# Patient Record
Sex: MALE | NOT HISPANIC OR LATINO | URBAN - METROPOLITAN AREA
[De-identification: names, ages, dates, MRNs, and addresses within clinical notes are randomized per-mention and may not be internally consistent; named-entity substitution may affect disease eponyms.]

---

## 2023-10-10 ENCOUNTER — OFFICE VISIT (OUTPATIENT)
Dept: UROLOGY | Facility: CLINIC | Age: 33
End: 2023-10-10

## 2023-10-10 VITALS
DIASTOLIC BLOOD PRESSURE: 88 MMHG | WEIGHT: 224.19 LBS | SYSTOLIC BLOOD PRESSURE: 138 MMHG | HEIGHT: 72 IN | HEART RATE: 74 BPM | BODY MASS INDEX: 30.37 KG/M2

## 2023-10-10 DIAGNOSIS — N52.8 OTHER MALE ERECTILE DYSFUNCTION: Primary | ICD-10-CM

## 2023-10-10 PROCEDURE — 99204 PR OFFICE/OUTPT VISIT, NEW, LEVL IV, 45-59 MIN: ICD-10-PCS | Mod: S$PBB,,, | Performed by: UROLOGY

## 2023-10-10 PROCEDURE — 99204 OFFICE O/P NEW MOD 45 MIN: CPT | Mod: S$PBB,,, | Performed by: UROLOGY

## 2023-10-10 PROCEDURE — 99999 PR PBB SHADOW E&M-NEW PATIENT-LVL III: CPT | Mod: PBBFAC,,, | Performed by: UROLOGY

## 2023-10-10 PROCEDURE — 99999 PR PBB SHADOW E&M-NEW PATIENT-LVL III: ICD-10-PCS | Mod: PBBFAC,,, | Performed by: UROLOGY

## 2023-10-10 PROCEDURE — 99203 OFFICE O/P NEW LOW 30 MIN: CPT | Mod: PBBFAC,PO | Performed by: UROLOGY

## 2023-10-10 RX ORDER — SILDENAFIL 100 MG/1
100 TABLET, FILM COATED ORAL DAILY PRN
Qty: 30 TABLET | Refills: 11 | Status: SHIPPED | OUTPATIENT
Start: 2023-10-10 | End: 2024-10-09

## 2023-10-10 RX ORDER — SILDENAFIL 100 MG/1
100 TABLET, FILM COATED ORAL DAILY PRN
Qty: 30 TABLET | Refills: 11 | Status: SHIPPED | OUTPATIENT
Start: 2023-10-10 | End: 2023-10-10

## 2023-10-10 NOTE — PROGRESS NOTES
[facility] - Urology   Clinic Note    SUBJECTIVE:     Chief Complaint   Patient presents with    Other    Erectile Dysfunction       Referral from: Self, Aaareferral.    History of Present Illness:  Cornelius Birch is a 33 y.o. male who presents to clinic for erectile dysfunction.    Patient here with complaints of erectile dysfunction.  He endorses difficulty getting and maintaining erections.  He is not able to reliably achieve a strong enough erection for intercourse.  Patient reports that he is no difficulty getting erections for masturbation and has erections in the morning but only has issues getting erections with his wife.  He reports that he had a very strict Druze and did not have intercourse prior to being .    CHRISTOPHER: 6/25    Previous ED treatment: no    Patient endorses no additional complaints at this time.    History reviewed. No pertinent past medical history.    History reviewed. No pertinent surgical history.    History reviewed. No pertinent family history.    Social History     Tobacco Use    Smoking status: Never    Smokeless tobacco: Never   Substance Use Topics    Drug use: Never       No current outpatient medications on file prior to visit.     No current facility-administered medications on file prior to visit.       Review of patient's allergies indicates:  No Known Allergies    Review of Systems:  A review of 10+ systems was conducted with pertinent positive and negative findings documented in HPI with all other systems reviewed and negative.    OBJECTIVE:     Estimated body mass index is 30.41 kg/m² as calculated from the following:    Height as of this encounter: 6' (1.829 m).    Weight as of this encounter: 101.7 kg (224 lb 3.3 oz).    Vital Signs (Most Recent)  Vitals:    10/10/23 1053   BP: 138/88   Pulse: 74       Physical Exam:  GENERAL: patient sitting comfortably  HEENT: normocephalic  NECK: supple, no JVD  PULM: normal chest rise, no increased WOB  HEART:  "non-diaphoretic  ABDO: soft, nondistended, nontender  BACK: no CVA tenderness bilaterally  SKIN: warm, dry, well perfused  EXT: no bruising or edema  NEURO: grossly normal with no focal deficits  PSYCH: appropriate mood and affect    Genitourinary Exam:  deferred    No results found for: "BUN", "CREATININE", "GFRNONAA", "GFRAA", "WBC", "HGB", "HCT", "PLT", "AST", "ALT", "ALKPHOS", "ALBUMIN", "HGBA1C", "PT", "PTT", "INR"     Imaging:   I have personally reviewed all relevant imaging studies.    No results found for this or any previous visit (from the past 2160 hour(s)).  No results found for this or any previous visit (from the past 2160 hour(s)).  No image results found.       PSA:  No results found for: "PSA", "PSADIAG", "PSATOTAL", "PSAFREE"    Testosterone:  No results found for: "TOTALTESTOST", "TESTOSTERONE"     ASSESSMENT     1. Other male erectile dysfunction        PLAN:     Erectile Dysfunction    - We discussed the etiology and management of ED, including organic and psychogenic causes. First line therapy involves treatment with PDE-5 inhibitors.    - We discussed the risks and benefits of treatment with PDE5i's. Treatment with HIMANSHU was also briefly discussed.     - Next line therapies would include intraurethral suppository (125-1000 ug), then intracavernosal injections (1 ml trimix papaverine 30 mg, phentolamine 1 mg, prostaglandin E1 10 ug). Ultimately, a penile prosthesis would be the final option.     - We will initiate therapy with a PDE5i with Viagra 100mg PRN.  I explained in detail that the patient's problem is more likely psychologic than physiologic as he gets erections with masturbation and in the morning and patient should consider seeing someone with experience in sexual and behavioral therapy.    Ronnie Edgar MD  Urology  [facility]    Disclaimer: This note has been generated using voice-recognition software. There may be typographical errors that have been missed " during proof-reading.

## 2024-06-07 ENCOUNTER — PATIENT MESSAGE (OUTPATIENT)
Dept: UROLOGY | Facility: CLINIC | Age: 34
End: 2024-06-07

## 2024-08-02 ENCOUNTER — TELEPHONE (OUTPATIENT)
Dept: UROLOGY | Facility: CLINIC | Age: 34
End: 2024-08-02

## 2024-08-02 NOTE — TELEPHONE ENCOUNTER
----- Message from Joanna Rosado sent at 8/2/2024  2:07 PM CDT -----  Regarding: Returning call  Contact: [phone]  Type:  Patient Returning Call    Who Called:Cornelius  Who Left Message for Patient:Yvescassidylakshmi  Does the patient know what this is regarding?:  Would the patient rather a call back or a response via [facility]? Both  Best Call Back Number:[phone]    Additional Information:

## 2024-08-02 NOTE — TELEPHONE ENCOUNTER
----- Message from Zan Farrell sent at 8/2/2024 12:11 PM CDT -----  Regarding: Callback  Contact: [phone]  Patient calling requesting a callback from nurse or provider. Please call back as soon as possible.

## 2024-08-02 NOTE — TELEPHONE ENCOUNTER
Call was placed back to patient he was successfully scheduled with the provider he was stated that he wanted to see  first instead of

## 2024-08-07 ENCOUNTER — OFFICE VISIT (OUTPATIENT)
Dept: UROLOGY | Facility: CLINIC | Age: 34
End: 2024-08-07

## 2024-08-07 VITALS
HEART RATE: 71 BPM | HEIGHT: 72 IN | SYSTOLIC BLOOD PRESSURE: 142 MMHG | BODY MASS INDEX: 31.56 KG/M2 | DIASTOLIC BLOOD PRESSURE: 81 MMHG | WEIGHT: 233 LBS

## 2024-08-07 DIAGNOSIS — N52.8 OTHER MALE ERECTILE DYSFUNCTION: Primary | ICD-10-CM

## 2024-08-07 PROBLEM — E29.1 TESTICULAR FAILURE: Status: RESOLVED | Noted: 2024-08-07 | Resolved: 2024-08-07

## 2024-08-07 PROBLEM — E29.1 TESTICULAR FAILURE: Status: ACTIVE | Noted: 2024-08-07

## 2024-08-07 PROCEDURE — 99999 PR PBB SHADOW E&M-EST. PATIENT-LVL III: CPT | Mod: PBBFAC,,, | Performed by: UROLOGY

## 2024-08-07 PROCEDURE — 99214 OFFICE O/P EST MOD 30 MIN: CPT | Mod: S$PBB,,, | Performed by: UROLOGY

## 2024-08-07 PROCEDURE — 99213 OFFICE O/P EST LOW 20 MIN: CPT | Mod: PBBFAC | Performed by: UROLOGY

## 2024-08-07 RX ORDER — PAPAVERINE HYDROCHLORIDE 30 MG/ML
INJECTION INTRAMUSCULAR; INTRAVENOUS
Qty: 5 ML | Refills: 0 | Status: SHIPPED | OUTPATIENT
Start: 2024-08-07

## 2024-08-07 RX ORDER — OLMESARTAN MEDOXOMIL 20 MG/1
20 TABLET ORAL DAILY
COMMUNITY

## 2024-08-12 ENCOUNTER — TELEPHONE (OUTPATIENT)
Dept: UROLOGY | Facility: CLINIC | Age: 34
End: 2024-08-12

## 2024-08-12 NOTE — TELEPHONE ENCOUNTER
Called pt to confirm appt on 8/13/24 at 8:40 am. Pt states that he had an emergency come up and will have to cancel appt. States he will call back to reschedule.

## 2024-08-28 ENCOUNTER — TELEPHONE (OUTPATIENT)
Dept: UROLOGY | Facility: CLINIC | Age: 34
End: 2024-08-28

## 2024-08-29 ENCOUNTER — OFFICE VISIT (OUTPATIENT)
Dept: UROLOGY | Facility: CLINIC | Age: 34
End: 2024-08-29

## 2024-08-29 VITALS
BODY MASS INDEX: 31.13 KG/M2 | HEART RATE: 68 BPM | DIASTOLIC BLOOD PRESSURE: 73 MMHG | SYSTOLIC BLOOD PRESSURE: 124 MMHG | HEIGHT: 72 IN | WEIGHT: 229.81 LBS

## 2024-08-29 DIAGNOSIS — N52.8 OTHER MALE ERECTILE DYSFUNCTION: Primary | ICD-10-CM

## 2024-08-29 PROCEDURE — 99215 OFFICE O/P EST HI 40 MIN: CPT | Mod: S$PBB,,, | Performed by: NURSE PRACTITIONER

## 2024-08-29 PROCEDURE — 99213 OFFICE O/P EST LOW 20 MIN: CPT | Mod: PBBFAC | Performed by: NURSE PRACTITIONER

## 2024-08-29 PROCEDURE — 99999 PR PBB SHADOW E&M-EST. PATIENT-LVL III: CPT | Mod: PBBFAC,,, | Performed by: NURSE PRACTITIONER

## 2024-08-29 NOTE — PATIENT INSTRUCTIONS
1. Wipe off top of medication vial with an alcohol prep.   2. With the vial held firmly on a flat surface, insert the syringe into the vial.  3. Now carefully  the vial with the needle in place and turn upside down.  4. You can then push in syringe (like you are giving a shot) to get all the air out of the syringe.  5. You can then pull the plunger of the syringe back down while keeping the needle inserted in the vial to get your desired results.   6. If having difficulty seeing the medication, rapidly pull back the syringe and then you will see the medication fill up.    You may never get all the tiny air bubbles out. It is ok.     He was instructed to keep the dosage at 10 units. If noticing a decrease in reaction he can increase the dosage by 5 units. He may also refill the Rx.     Penile Self-Injection Procedure  Self-injection is a good option for many men with erectile dysfunction (ED). A tiny needle is used to inject medication into the penis. This helps your penis get hard and stay that way long enough for sex. And sex and orgasm will feel as good as always. You may be nervous about doing self-injection at first. But with practice, it will get easier. Your healthcare provider will show you how to do self-injection the first time. The simple steps are outlined on this sheet.  Preparing for Injection  Wash your hands well with soap and water.  Prepare the medication (if needed).  Sit or  a comfortable position in a warm, well-lit room. If you need to, sit or  front of a mirror.  Find an injection site on one side of your penis, in a place with no visible veins. (Dont inject into the top, bottom, or head of the penis.)  Clean the injection site with an alcohol swab. Grasp the head of your penis firmly with your thumb and forefinger (dont just pinch the skin). Stretch the penis so the skin on the shaft is taut.  Injecting the Medication  Rest your penis against your inner thigh and pull  it gently toward your knee. Dont twist or rotate it. This way youll be sure to inject the medication into the spot you chose and cleaned before.  Hold the syringe between your thumb and fingers, like youre holding a pen. Rest your forearm on your thigh for support.  Insert the needle at a 90-degree angle (perpendicular) to the shaft. Do this quickly to reduce discomfort. (The needle should go in easily. If it doesnt, stop right away.)  Move your thumb to the plunger. Press down to inject the medication, counting to 5.  Remove the needle and dispose of it safely.     The injection site can be in any part of the shaded area.     Insert the needle straight into the penis.    Gaining an Erection  Apply pressure to the injection site for a few minutes. This prevents swelling and bruising and helps spread the medication.   Stand up. This may help your erection develop. Foreplay often helps, too.  Your penis should become firm within 10-20 minutes. The erection will last long enough for sex, and maybe longer.  Call Your Doctor If You Have:   An erection that lasts longer than 3-4  hours  Bleeding or bruising  Severe pain  Scarring or curvature of the penis       © 5925-5849 [employer] [facility], [address] [place] [address], [place] [address]. All rights reserved. This information is not intended as a substitute for professional medical care. Always follow your healthcare professional's instructions.

## 2024-08-29 NOTE — PROGRESS NOTES
CHIEF COMPLAINT:    Cornelius Birch is a 34 y.o. male presents today for ED.     HISTORY OF PRESENTING ILLINESS:    Cornelius Birch is a 34 y.o. male who is an established in our clinic. He was seen 08/07/2024 with Dr. Perez for ED.  He and his wife have not been able to have successful intercourse.  He can get an erection, but he loses the erection when he attempts intercourse.  He gets a better erection with masturbation than intercourse.   He's seen multiple providers in the community and has been told that it's psychogenic.    He's tried and failed Viagra and 5 mg of Cialis  Failed oral agents. Failed Soundwave therapy.     He reports a significant amount of stress over this.    He and his wife are here today for ICI education and 1st injection.   Brought in his own medication.           REVIEW OF SYSTEMS:  Review of Systems   Constitutional: Negative.  Negative for chills and fever.   Eyes:  Negative for double vision.   Respiratory:  Negative for cough and shortness of breath.    Cardiovascular:  Negative for chest pain and palpitations.   Gastrointestinal:  Negative for abdominal pain, constipation, diarrhea, nausea and vomiting.   Genitourinary:         See HPI   Musculoskeletal:  Negative for falls.   Neurological:  Negative for dizziness and seizures.   Endo/Heme/Allergies:  Negative for polydipsia.   Psychiatric/Behavioral:  The patient is nervous/anxious.          PATIENT HISTORY:    Past Medical History:   Diagnosis Date    HTN (hypertension)        History reviewed. No pertinent surgical history.    No family history on file.    Social History     Socioeconomic History    Marital status:    Tobacco Use    Smoking status: Never    Smokeless tobacco: Never   Substance and Sexual Activity    Drug use: Never    Sexual activity: Yes     Partners: Female       Allergies:  Patient has no known allergies.    Medications:    Current Outpatient Medications:     olmesartan (BENICAR) 20 MG tablet, Take 20  "mg by mouth once daily., Disp: , Rfl:     PHYSICAL EXAMINATION:  Physical Exam  Vitals and nursing note reviewed.   Constitutional:       General: He is awake.      Appearance: Normal appearance.   HENT:      Head: Normocephalic.      Right Ear: External ear normal.      Left Ear: External ear normal.      Nose: Nose normal.   Cardiovascular:      Rate and Rhythm: Normal rate.   Pulmonary:      Effort: Pulmonary effort is normal. No respiratory distress.   Abdominal:      Tenderness: There is no abdominal tenderness. There is no right CVA tenderness or left CVA tenderness.   Genitourinary:     Penis: Normal and circumcised. No phimosis, paraphimosis or hypospadias.       Testes: Normal.   Musculoskeletal:         General: Normal range of motion.      Cervical back: Normal range of motion.   Skin:     General: Skin is warm and dry.   Neurological:      General: No focal deficit present.      Mental Status: He is alert and oriented to person, place, and time.   Psychiatric:         Mood and Affect: Mood normal.         Behavior: Behavior is cooperative.         LABS:        No results found for: "PSA", "PSADIAG", "PSATOTAL", "PHIND"    No results found for: "CREATININE", "EGFRNORACEVR"            IMPRESSION:    Encounter Diagnoses   Name Primary?    Other male erectile dysfunction Yes         Assessment:       1. Other male erectile dysfunction        Plan:         I spent 40 minutes with the patient. Over 50% of the visit was spent in counseling.    We discussed ED and the contributing factors. We reviewed his personal factors that contribute to ED. Patient was educated on ED treatments. We discussed PDE-5 inhibitors, HIMANSHU, Muse, and IPP.    He is here today for the Intracorporal injection/ICI education and first injection.  Educational materials were supplied.    ICI is an injectable medication that consists of three different medications to produce an erection. It is known as a Trimix Compound or PEP. The medication " is a compound medication and is only mixed up at certain pharmacies known as a compound pharmacy. The medication has to be stored in the refrigerator. Improper storage decreases the effectiveness of the medication.     He was educated that it is an injection. With any injection there is risk for possible pain at the injection site as well as risk for an infection. Clean technique must be followed to help prevent infection. Proper needle disposable is necessary. He voices understanding.    The injection can be given while standing up or while sitting and lean back to prevent penis from retracting. The penis is positioned perpendicular to the body. The urethral opening should be facing away from the body; then gentle pull to the side opposite of the injection site. Injection is always given on the lateral or side of the penis. Never give the injection to the top of the penis to avoid possible trauma to arteries and veins. Never give the injection to the bottom of the penis to avoid trauma to the urethra. He should alternate the injection sites to avoid the build up of scar tissue due to multiple injections.    This medication can increase the risk of erections lasting longer than 4 hours. This is known as a priapism and is a medical emergency. This requires immediate medical attention. This is main reason we give the first dose in the office today. We start at low dose and see how well the patients reacts. We can increase the medication if needed depending on the reaction the patient receives today. We discussed the fine line between enough medication for penetration and too much leading to a priapism. He voices understanding.    Correctly maryjo up the initial dose and injected 5 units appropriately in the right lateral aspect of the penis. Within 10 minutes, he achieved a full erection. 30 minutes after injection he reported some detumescence. Very pleased. No discomfort.     He was instructed to keep the dosage at 10  units. If noticing a decrease in reaction he can increase the dosage by 5 units. He may also refill the Rx.   Refills were sent to [facility]    If have any questions, please give me a call. Educational materials were given to patient and all questions were answered. He voiced understanding and left the office without a priapism. Follow up 3 months.

## 2024-09-13 ENCOUNTER — PATIENT MESSAGE (OUTPATIENT)
Dept: UROLOGY | Facility: CLINIC | Age: 34
End: 2024-09-13

## 2024-09-13 DIAGNOSIS — N52.8 OTHER MALE ERECTILE DYSFUNCTION: Primary | ICD-10-CM

## 2024-09-20 DIAGNOSIS — N52.8 OTHER MALE ERECTILE DYSFUNCTION: Primary | ICD-10-CM

## 2024-12-16 ENCOUNTER — PATIENT MESSAGE (OUTPATIENT)
Dept: UROLOGY | Facility: CLINIC | Age: 34
End: 2024-12-16

## 2024-12-18 DIAGNOSIS — N52.9 ED (ERECTILE DYSFUNCTION) OF ORGANIC ORIGIN: Primary | ICD-10-CM
